# Patient Record
Sex: MALE | Race: OTHER | URBAN - METROPOLITAN AREA
[De-identification: names, ages, dates, MRNs, and addresses within clinical notes are randomized per-mention and may not be internally consistent; named-entity substitution may affect disease eponyms.]

---

## 2020-02-03 ENCOUNTER — EMERGENCY (EMERGENCY)
Facility: HOSPITAL | Age: 65
LOS: 1 days | Discharge: LEFT BEFORE DISCHARGE | End: 2020-02-03
Admitting: EMERGENCY MEDICINE

## 2020-02-03 VITALS
HEART RATE: 109 BPM | OXYGEN SATURATION: 98 % | RESPIRATION RATE: 17 BRPM | WEIGHT: 160.06 LBS | TEMPERATURE: 98 F | SYSTOLIC BLOOD PRESSURE: 136 MMHG | HEIGHT: 65 IN | DIASTOLIC BLOOD PRESSURE: 79 MMHG

## 2020-02-03 NOTE — ED ADULT TRIAGE NOTE - CHIEF COMPLAINT QUOTE
"I feel off balance". Pt reports taking 3 shots of vodka today, + AOB. PT reports alcoholism for 50 years. Denies weakness, numbness, tingling, facial droop, slurred speech, HA or dizziness.

## 2020-02-04 NOTE — ED ADULT NURSE NOTE - NSIMPLEMENTINTERV_GEN_ALL_ED
Implemented All Fall with Harm Risk Interventions:  [place] to call system. Call bell, personal items and telephone within reach. Instruct patient to call for assistance. Room bathroom lighting operational. Non-slip footwear when patient is off stretcher. Physically safe environment: no spills, clutter or unnecessary equipment. Stretcher in lowest position, wheels locked, appropriate side rails in place. Provide visual cue, wrist band, yellow gown, etc. Monitor gait and stability. Monitor for mental status changes and reorient to person, place, and time. Review medications for side effects contributing to fall risk. Reinforce activity limits and safety measures with patient and family. Provide visual clues: red socks.

## 2020-02-04 NOTE — ED ADULT NURSE NOTE - OBJECTIVE STATEMENT
63 yo M c.o feeling "off". Pt admits to ETOH use today. Pt denies CP, SOB, n/v/d/f/chills/numbness or tingling to bl upper and lower extremities. No facial droop, slurred speech noted. +AOB noted. Pt OOB steady gait. Pt placed near nursing station for safety. Will continue to monitor

## 2020-02-04 NOTE — ED ADULT NURSE NOTE - CHPI ED NUR SYMPTOMS NEG
no nausea/no chills/no decreased eating/drinking/no dizziness/no tingling/no pain/no vomiting/no weakness/no fever

## 2020-02-04 NOTE — ED ADULT NURSE NOTE - EXPLANATION OF PATIENT'S REASON FOR LEAVING
Pt stated to Charge RN that he was feeling better, noted OOB steady gait and left unit. MD Guzman aware

## 2020-02-07 DIAGNOSIS — F10.10 ALCOHOL ABUSE, UNCOMPLICATED: ICD-10-CM

## 2020-03-02 ENCOUNTER — EMERGENCY (EMERGENCY)
Facility: HOSPITAL | Age: 65
LOS: 1 days | Discharge: HOME | End: 2020-03-02
Admitting: EMERGENCY MEDICINE
Payer: SELF-PAY

## 2020-03-02 VITALS
DIASTOLIC BLOOD PRESSURE: 86 MMHG | RESPIRATION RATE: 18 BRPM | HEART RATE: 93 BPM | SYSTOLIC BLOOD PRESSURE: 143 MMHG | OXYGEN SATURATION: 95 % | TEMPERATURE: 98 F

## 2020-03-02 VITALS
HEIGHT: 63 IN | WEIGHT: 156.97 LBS | RESPIRATION RATE: 18 BRPM | HEART RATE: 103 BPM | DIASTOLIC BLOOD PRESSURE: 85 MMHG | SYSTOLIC BLOOD PRESSURE: 132 MMHG | TEMPERATURE: 98 F | OXYGEN SATURATION: 94 %

## 2020-03-02 DIAGNOSIS — R41.82 ALTERED MENTAL STATUS, UNSPECIFIED: ICD-10-CM

## 2020-03-02 DIAGNOSIS — F10.120 ALCOHOL ABUSE WITH INTOXICATION, UNCOMPLICATED: ICD-10-CM

## 2020-03-02 PROCEDURE — 99284 EMERGENCY DEPT VISIT MOD MDM: CPT

## 2020-03-02 PROCEDURE — 70450 CT HEAD/BRAIN W/O DYE: CPT | Mod: 26

## 2020-03-02 NOTE — ED PROVIDER NOTE - PROGRESS NOTE DETAILS
head CT negative.   The patient is now awake and alert, clinically sober.  Able to walk a straight line.  Repeat exam and neuro/cranial nerve exams normal.  No evidence of head/neck trauma.  Patient denies any pain or other complaints.  Denies cp/sob/ha/abd pain.  Abd soft, lungs clear, heart exam normal.  Jennyfer po challenge.  Patient says only used alcohol no other substances.  Denies any assault.  Feels much better and pt feels safe for discharge.  No evidence of intoxication at this time or alcohol withdrawal.  No other complaints on discharge.

## 2020-03-02 NOTE — ED ADULT NURSE NOTE - CHPI ED NUR SYMPTOMS NEG
no pain/no disorientation/no vomiting/no abdominal pain/no chills/no confusion/no fever/no nausea/no weakness/no abdominal distension

## 2020-03-02 NOTE — ED PROVIDER NOTE - NSFOLLOWUPINSTRUCTIONS_ED_ALL_ED_FT
1)  PLEASE FOLLOW-UP WITH YOUR PRIMARY CARE DOCTOR OR REFERRED SPECIALIST IN 1-2 DAYS.     2)  BRING ALL PAPERWORK FROM TODAY'S EMERGENCY ROOM  VISIT TO YOUR FOLLOW-UP VISIT.  IF YOU DO NOT HAVE A PRIMARY CARE DOCTOR PLEASE REFER TO THE OFFICE/CLINIC INFORMATION GIVEN ABOVE.  YOU MAY ALSO CALL [phone] AND ASK FOR MS. LINDA ROGERS.  SHE CAN HELP YOU MAKE A FOLLOW-UP APPOINTMENT.  HER HOURS ARE 11AM-7PM MONDAY - FRIDAY.    3) PLEASE RETURN TO THE ER IMMEDIATELY OR CALL 911 FOR ANY HIGH FEVER, TROUBLE BREATHING, CHEST PAIN, WEAKNESS, VOMITING, SEVERE PAIN, OR ANY OTHER CONCERNS.

## 2020-03-02 NOTE — ED ADULT TRIAGE NOTE - CHIEF COMPLAINT QUOTE
Pt BIBA for altered mental status, endorses EtOH use, coworkers activated EMS when he showed up to work intoxicated, A + O x 3

## 2020-03-02 NOTE — ED PROVIDER NOTE - CLINICAL SUMMARY MEDICAL DECISION MAKING FREE TEXT BOX
66 y/o male with hx of stroke presents for alcohol intoxication. Patient without any signs of trauma. Will get head CT to r/o stroke. Will observe and discharge when patient is clinically sober.

## 2020-03-02 NOTE — ED PROVIDER NOTE - PATIENT PORTAL LINK FT
You can access the FollowMyHealth Patient Portal offered by [facility] by registering at the following website: [email or web address]. By joining [employer]’s FollowMyHealth portal, you will also be able to view your health information using other applications (apps) compatible with our system.